# Patient Record
Sex: FEMALE | Race: OTHER | URBAN - METROPOLITAN AREA
[De-identification: names, ages, dates, MRNs, and addresses within clinical notes are randomized per-mention and may not be internally consistent; named-entity substitution may affect disease eponyms.]

---

## 2021-12-09 ENCOUNTER — OFFICE VISIT (OUTPATIENT)
Dept: OTOLARYNGOLOGY | Facility: CLINIC | Age: 66
End: 2021-12-09
Payer: MEDICARE

## 2021-12-09 VITALS — WEIGHT: 170 LBS | BODY MASS INDEX: 25.76 KG/M2 | HEIGHT: 68 IN

## 2021-12-09 DIAGNOSIS — K21.9 GASTROESOPHAGEAL REFLUX DISEASE, UNSPECIFIED WHETHER ESOPHAGITIS PRESENT: Primary | ICD-10-CM

## 2021-12-09 DIAGNOSIS — J01.30 ACUTE NON-RECURRENT SPHENOIDAL SINUSITIS: ICD-10-CM

## 2021-12-09 PROCEDURE — 3008F BODY MASS INDEX DOCD: CPT | Performed by: SPECIALIST

## 2021-12-09 PROCEDURE — 31575 DIAGNOSTIC LARYNGOSCOPY: CPT | Performed by: SPECIALIST

## 2021-12-09 PROCEDURE — 99202 OFFICE O/P NEW SF 15 MIN: CPT | Performed by: SPECIALIST

## 2021-12-09 RX ORDER — ASPIRIN 81 MG/1
81 TABLET ORAL DAILY
COMMUNITY

## 2021-12-09 RX ORDER — OMEPRAZOLE 40 MG/1
40 CAPSULE, DELAYED RELEASE ORAL DAILY
Qty: 30 CAPSULE | Refills: 11 | Status: SHIPPED | OUTPATIENT
Start: 2021-12-09

## 2021-12-09 RX ORDER — OMEPRAZOLE 40 MG/1
40 CAPSULE, DELAYED RELEASE ORAL
COMMUNITY
Start: 2021-10-27

## 2021-12-09 RX ORDER — DOXYCYCLINE HYCLATE 100 MG/1
100 CAPSULE ORAL 2 TIMES DAILY
Qty: 28 CAPSULE | Refills: 0 | Status: SHIPPED | OUTPATIENT
Start: 2021-12-09

## 2021-12-09 NOTE — PROGRESS NOTES
Gladis Trejo is a 74year old female. Patient presents with: Follow - Up: pt presents today for f/u on acid reflux, and needs refills omeprazole. HPI:   With longstanding reflux disease.     Current Outpatient Medications   Medication Sig Dispense Refi Supraclavicular. All without enlargement   Psychiatric Orientation - Oriented to time, place, person & situation. Appropriate mood and affect. Neurological Memory - Normal. Cranial nerves - Cranial nerves II through XII grossly intact.    Nasopharynx  no

## 2021-12-09 NOTE — PATIENT INSTRUCTIONS
You have some posterior laryngeal erythema consistent with your reflux disease. Please continue the omeprazole and this antireflux diet.   No greasy foods, spicy foods, chocolate, caffeine, alcohol, spearmint, peppermint, lemon, lime, orange, grapefruit, t

## 2022-11-14 RX ORDER — OMEPRAZOLE 40 MG/1
CAPSULE, DELAYED RELEASE ORAL
Qty: 30 CAPSULE | Refills: 11 | Status: SHIPPED | OUTPATIENT
Start: 2022-11-14

## 2022-11-14 NOTE — TELEPHONE ENCOUNTER
This refill request is being sent to the provider for the following reason:  []Patient has not had an appointment within the past 12 months but has made an appointment on: ___  [x]Medication is not within protocol  []Patient did not complete follow up recommendations  [x]Other: Pt not seen since 12/2021.___

## 2023-09-11 RX ORDER — OMEPRAZOLE 40 MG/1
CAPSULE, DELAYED RELEASE ORAL
Qty: 30 CAPSULE | Refills: 11 | OUTPATIENT
Start: 2023-09-11

## 2023-09-11 NOTE — TELEPHONE ENCOUNTER
This refill request is being sent to the provider for the following reason:  [x]Patient has not had an appointment within the past 12 months   []Medication is not within protocol  []Patient did not complete follow up recommendations  []Other: ___      Spoke with patient and will call back to office to make an appointment once she checks her schedule for refills.

## 2023-09-19 ENCOUNTER — OFFICE VISIT (OUTPATIENT)
Dept: OTOLARYNGOLOGY | Facility: CLINIC | Age: 68
End: 2023-09-19

## 2023-09-19 VITALS — WEIGHT: 171 LBS | BODY MASS INDEX: 25.91 KG/M2 | HEIGHT: 68 IN

## 2023-09-19 DIAGNOSIS — J34.2 NASAL SEPTAL DEVIATION: ICD-10-CM

## 2023-09-19 DIAGNOSIS — K21.9 GASTROESOPHAGEAL REFLUX DISEASE, UNSPECIFIED WHETHER ESOPHAGITIS PRESENT: Primary | ICD-10-CM

## 2023-09-19 PROCEDURE — 1159F MED LIST DOCD IN RCRD: CPT | Performed by: SPECIALIST

## 2023-09-19 PROCEDURE — 3008F BODY MASS INDEX DOCD: CPT | Performed by: SPECIALIST

## 2023-09-19 PROCEDURE — 1160F RVW MEDS BY RX/DR IN RCRD: CPT | Performed by: SPECIALIST

## 2023-09-19 PROCEDURE — 99213 OFFICE O/P EST LOW 20 MIN: CPT | Performed by: SPECIALIST

## 2023-09-19 RX ORDER — LOSARTAN POTASSIUM 50 MG/1
50 TABLET ORAL DAILY
COMMUNITY

## 2023-09-19 RX ORDER — OMEPRAZOLE 40 MG/1
40 CAPSULE, DELAYED RELEASE ORAL
Qty: 90 CAPSULE | Refills: 3 | Status: SHIPPED | OUTPATIENT
Start: 2023-09-19

## 2023-09-20 NOTE — PATIENT INSTRUCTIONS
Make sure to take your omeprazole on an empty stomach as directed. You have an incidental right septal deviation. We once again discussed the antireflux diet. No greasy foods, spicy foods, chocolate, caffeine, alcohol, spearmint, peppermint, lemon, lime, orange, grapefruit, tomatoes. Don't eat 2 hours before bedtime  Elevate the head of bed   1 years time, sooner if problems.

## 2024-01-09 ENCOUNTER — OFFICE VISIT (OUTPATIENT)
Dept: OTOLARYNGOLOGY | Facility: CLINIC | Age: 69
End: 2024-01-09

## 2024-01-09 VITALS — WEIGHT: 172 LBS | HEIGHT: 68 IN | BODY MASS INDEX: 26.07 KG/M2

## 2024-01-09 DIAGNOSIS — R42 VERTIGO: Primary | ICD-10-CM

## 2024-01-09 DIAGNOSIS — J34.2 NASAL SEPTAL DEVIATION: ICD-10-CM

## 2024-01-09 PROCEDURE — 1160F RVW MEDS BY RX/DR IN RCRD: CPT | Performed by: SPECIALIST

## 2024-01-09 PROCEDURE — 3008F BODY MASS INDEX DOCD: CPT | Performed by: SPECIALIST

## 2024-01-09 PROCEDURE — 1159F MED LIST DOCD IN RCRD: CPT | Performed by: SPECIALIST

## 2024-01-09 PROCEDURE — 99214 OFFICE O/P EST MOD 30 MIN: CPT | Performed by: SPECIALIST

## 2024-01-09 RX ORDER — MECLIZINE HCL 12.5 MG/1
12.5 TABLET ORAL 3 TIMES DAILY PRN
Qty: 20 TABLET | Refills: 1 | Status: SHIPPED | OUTPATIENT
Start: 2024-01-09

## 2024-01-09 NOTE — PROGRESS NOTES
Sheree Smith is a 68 year old female.   Chief Complaint   Patient presents with    Follow - Up     Ears and gastroesophageal reflux disease, unspecified whether esophagitis present     HPI:   Patient with vertigo associated with nausea starting the day before Danese jazmín.  It lasted several days although lessened each day.  Was not positional.  No other neurologic symptoms.    Current Outpatient Medications   Medication Sig Dispense Refill    meclizine 12.5 MG Oral Tab Take 1 tablet (12.5 mg total) by mouth 3 (three) times daily as needed. 20 tablet 1    losartan 50 MG Oral Tab Take 1 tablet (50 mg total) by mouth daily.      Omeprazole 40 MG Oral Capsule Delayed Release Take 1 capsule (40 mg total) by mouth before breakfast. 90 capsule 3    OMEPRAZOLE 40 MG Oral Capsule Delayed Release TAKE 1 CAPSULE(40 MG) BY MOUTH DAILY BEFORE A MEAL 30 capsule 11    aspirin 81 MG Oral Tab EC Take 1 tablet (81 mg total) by mouth daily.      doxycycline 100 MG Oral Cap Take 1 capsule (100 mg total) by mouth 2 (two) times daily. (Patient not taking: Reported on 9/19/2023) 28 capsule 0      History reviewed. No pertinent past medical history.   Social History:  Social History     Socioeconomic History    Marital status:    Tobacco Use    Smoking status: Never    Smokeless tobacco: Never   Substance and Sexual Activity    Alcohol use: Never    Drug use: Never        REVIEW OF SYSTEMS:   GENERAL HEALTH: feels well otherwise  GENERAL : denies fever, chills, sweats, weight loss, weight gain  SKIN: denies any unusual skin lesions or rashes  RESPIRATORY: denies shortness of breath with exertion  NEURO: denies headaches    EXAM:   Ht 5' 8\" (1.727 m)   Wt 172 lb (78 kg)   BMI 26.15 kg/m²   System Details   Skin Inspection - Normal.   Constitutional Overall appearance - Normal.   Head/Face Facial features - Normal. Eyebrows - Normal. Skull - Normal.   Eyes Conjunctiva - Right: Normal, Left: Normal. Pupil - Right: Normal, Left:  Normal.    Ears Inspection - Right: Normal, Left: Normal.   Canal - Right: Normal, Left: Normal.   TM - Right: Normal, Left: Normal.    Fork 256 hz right equals left   Nasal External nose - Normal.   Nasal septum -right septal deviation  Turbinates - Normal.   Oral/Oropharynx Lips - Normal, Tonsils - Normal, Tongue - Normal    Neck Exam Inspection - Normal. Palpation - Normal. Parotid gland - Normal. Thyroid gland - Normal.  No carotid bruits by auscultation   Lymph Detail Submental. Submandibular. Anterior cervical. Posterior cervical. Supraclavicular all without enlargement   Psychiatric Orientation - Oriented to time, place, person & situation. Appropriate mood and affect.   Neurological Memory - Normal. Cranial nerves - Cranial nerves II through XII grossly intact.  Gait testing within normal limits     ASSESSMENT AND PLAN:   1. Vertigo  Vertigo lasting days getting progressively better starting the day before Rupesh Kenyatta.  Most likely vestibular neuronitis.  Hearing seems grossly symmetric.  No associated disequilibrium.  At the time no other neurologic symptoms.  A refill of meclizine was sent to the pharmacy.  I did ask the patient to notify me if symptoms return.    2. Nasal septal deviation  To the right      The patient indicates understanding of these issues and agrees to the plan.      Mercedes Holliday MD  1/9/2024  12:16 PM

## 2024-01-09 NOTE — PATIENT INSTRUCTIONS
You had a negative head and neck evaluation on the date of your visit.  Included tuning fork evaluation which seemed to indicate symmetric hearing, and a negative gait testing.  A refill of your Antivert was sent to the pharmacy.  Please call or notify me if any additional vertigo occurs.

## 2025-01-03 RX ORDER — OMEPRAZOLE 40 MG/1
40 CAPSULE, DELAYED RELEASE ORAL
Qty: 30 CAPSULE | Refills: 1 | Status: SHIPPED | OUTPATIENT
Start: 2025-01-03

## 2025-04-02 RX ORDER — OMEPRAZOLE 40 MG/1
40 CAPSULE, DELAYED RELEASE ORAL
Qty: 30 CAPSULE | Refills: 1 | OUTPATIENT
Start: 2025-04-02

## 2025-04-02 NOTE — TELEPHONE ENCOUNTER
This refill request is being sent to the provider for the following reason:  [x]Patient has not had an appointment within the past 12 months but has made an appointment on: ___  []Medication is not within protocol  []Patient did not complete follow up recommendations  []Other: ___

## 2025-04-21 RX ORDER — OMEPRAZOLE 40 MG/1
40 CAPSULE, DELAYED RELEASE ORAL
Qty: 30 CAPSULE | Refills: 1 | Status: SHIPPED | OUTPATIENT
Start: 2025-04-21

## 2025-04-21 NOTE — TELEPHONE ENCOUNTER
Dr. Holliday, patient has an appt scheduled for 6/3/25, her last appt was 1/9/24, she needs a refill of Omeprazole to get her thru until this appt, refill is pended for your approval.

## 2025-04-21 NOTE — TELEPHONE ENCOUNTER
Pt called.  Scheduled an appointment 6-3-25.  Can pt get a refill on rx. Omeprazole.  Send to [facility].  Please call pt

## 2025-06-03 ENCOUNTER — OFFICE VISIT (OUTPATIENT)
Dept: OTOLARYNGOLOGY | Facility: CLINIC | Age: 70
End: 2025-06-03

## 2025-06-03 VITALS — WEIGHT: 180 LBS | HEIGHT: 68 IN | BODY MASS INDEX: 27.28 KG/M2

## 2025-06-03 DIAGNOSIS — R42 VERTIGO: ICD-10-CM

## 2025-06-03 DIAGNOSIS — K21.9 GASTROESOPHAGEAL REFLUX DISEASE, UNSPECIFIED WHETHER ESOPHAGITIS PRESENT: Primary | ICD-10-CM

## 2025-06-03 DIAGNOSIS — J34.2 NASAL SEPTAL DEVIATION: ICD-10-CM

## 2025-06-03 PROCEDURE — 1159F MED LIST DOCD IN RCRD: CPT | Performed by: SPECIALIST

## 2025-06-03 PROCEDURE — 1160F RVW MEDS BY RX/DR IN RCRD: CPT | Performed by: SPECIALIST

## 2025-06-03 PROCEDURE — 3008F BODY MASS INDEX DOCD: CPT | Performed by: SPECIALIST

## 2025-06-03 PROCEDURE — 99213 OFFICE O/P EST LOW 20 MIN: CPT | Performed by: SPECIALIST

## 2025-06-03 RX ORDER — ROSUVASTATIN CALCIUM 10 MG/1
10 TABLET, COATED ORAL DAILY
COMMUNITY
Start: 2025-05-15

## 2025-06-03 RX ORDER — METOPROLOL SUCCINATE 50 MG/1
50 TABLET, EXTENDED RELEASE ORAL DAILY
COMMUNITY
Start: 2025-05-15

## 2025-06-03 RX ORDER — OMEPRAZOLE 40 MG/1
40 CAPSULE, DELAYED RELEASE ORAL
Qty: 30 CAPSULE | Refills: 5 | Status: SHIPPED | OUTPATIENT
Start: 2025-06-03

## 2025-06-03 NOTE — PROGRESS NOTES
Sheree Smith is a 70 year old female.   Chief Complaint   Patient presents with    Follow - Up     gastroesophageal reflux disease, unspecified whether esophagitis present and refill of omeprazole      HPI:   Patient here for her yearly follow-up.  Is on omeprazole for reflux disease.  Has difficulty coming off of it.    Current Medications[1]   Past Medical History[2]   Social History:  Short Social Hx on File[3]     REVIEW OF SYSTEMS:   GENERAL HEALTH: feels well otherwise  GENERAL : denies fever, chills, sweats, weight loss, weight gain  SKIN: denies any unusual skin lesions or rashes  RESPIRATORY: denies shortness of breath with exertion  NEURO: denies headaches    EXAM:   Ht 5' 8\" (1.727 m)   Wt 180 lb (81.6 kg)   BMI 27.37 kg/m²   System Details   Skin Inspection - Normal.   Constitutional Overall appearance - Normal.   Head/Face Facial features - Normal. Eyebrows - Normal. Skull - Normal.   Eyes Conjunctiva - Right: Normal, Left: Normal. Pupil - Right: Normal, Left: Normal.    Ears Inspection - Right: Normal, Left: Normal.   Canal - Right: Normal, Left: Normal.    TM - Right: Normal, Left: Normal.   Nasal External nose - Normal.   Nasal septum -right septal deviation  Turbinates - Normal   Oral/Oropharynx Lips - Normal, Tonsils - Normal, Tongue - Normal    Neck Exam Inspection - Normal. Palpation - Normal. Parotid gland - Normal. Thyroid gland - Normal.   Lymph Detail Submental. Submandibular. Anterior cervical. Posterior cervical. Supraclavicular.  All without enlargement   Psychiatric Orientation - Oriented to time, place, person & situation. Appropriate mood and affect.   Neurological Memory - Normal. Cranial nerves - Cranial nerves II through XII grossly intact.   Larynx Mirror examination with slight posterior laryngeal erythema.  Normal vocal cord mobility.  No tumors or masses seen.  No retained secretions.     ASSESSMENT AND PLAN:   1. Gastroesophageal reflux disease, unspecified whether esophagitis  present  Patient to continue omeprazole and antireflux diet.  Follow-up in 1 years time, sooner if problems.    2. Nasal septal deviation  To the right.    3. Vertigo  Improvement in vertigo.  Can use meclizine on an as needed basis.      The patient indicates understanding of these issues and agrees to the plan.      Mercedes Holliday MD  6/3/2025  4:03 PM         [1]   Current Outpatient Medications   Medication Sig Dispense Refill    metoprolol succinate ER 50 MG Oral Tablet 24 Hr Take 1 tablet (50 mg total) by mouth daily.      rosuvastatin 10 MG Oral Tab Take 1 tablet (10 mg total) by mouth daily.      Omeprazole 40 MG Oral Capsule Delayed Release Take 1 capsule (40 mg total) by mouth before breakfast. 30 capsule 5    meclizine 12.5 MG Oral Tab Take 1 tablet (12.5 mg total) by mouth 3 (three) times daily as needed. 20 tablet 1    losartan 50 MG Oral Tab Take 1 tablet (50 mg total) by mouth daily.      aspirin 81 MG Oral Tab EC Take 1 tablet (81 mg total) by mouth daily.      doxycycline 100 MG Oral Cap Take 1 capsule (100 mg total) by mouth 2 (two) times daily. (Patient not taking: Reported on 6/3/2025) 28 capsule 0   [2] History reviewed. No pertinent past medical history.  [3]   Social History  Socioeconomic History    Marital status:    Tobacco Use    Smoking status: Never    Smokeless tobacco: Never   Vaping Use    Vaping status: Never Used   Substance and Sexual Activity    Alcohol use: Never    Drug use: Never

## 2025-06-03 NOTE — PATIENT INSTRUCTIONS
Continue omeprazole for your reflux disease as well as your antireflux diet.  Can use meclizine on an as needed basis for your vertigo.  Follow-up in 1 years time, sooner if problems.

## 2025-08-05 ENCOUNTER — OFFICE VISIT (OUTPATIENT)
Dept: OTOLARYNGOLOGY | Facility: CLINIC | Age: 70
End: 2025-08-05

## 2025-08-05 VITALS — HEIGHT: 68 IN | WEIGHT: 180 LBS | BODY MASS INDEX: 27.28 KG/M2

## 2025-08-05 DIAGNOSIS — H92.01 REFERRED OTALGIA, RIGHT: ICD-10-CM

## 2025-08-05 DIAGNOSIS — K21.9 GASTROESOPHAGEAL REFLUX DISEASE, UNSPECIFIED WHETHER ESOPHAGITIS PRESENT: ICD-10-CM

## 2025-08-05 DIAGNOSIS — E06.9 THYROIDITIS: Primary | ICD-10-CM

## 2025-08-05 PROCEDURE — 1160F RVW MEDS BY RX/DR IN RCRD: CPT | Performed by: SPECIALIST

## 2025-08-05 PROCEDURE — 1159F MED LIST DOCD IN RCRD: CPT | Performed by: SPECIALIST

## 2025-08-05 PROCEDURE — 31575 DIAGNOSTIC LARYNGOSCOPY: CPT | Performed by: SPECIALIST

## 2025-08-05 PROCEDURE — 99213 OFFICE O/P EST LOW 20 MIN: CPT | Performed by: SPECIALIST

## 2025-08-05 PROCEDURE — 3008F BODY MASS INDEX DOCD: CPT | Performed by: SPECIALIST
